# Patient Record
Sex: MALE | Race: WHITE | NOT HISPANIC OR LATINO
[De-identification: names, ages, dates, MRNs, and addresses within clinical notes are randomized per-mention and may not be internally consistent; named-entity substitution may affect disease eponyms.]

---

## 2017-02-20 ENCOUNTER — RX RENEWAL (OUTPATIENT)
Age: 82
End: 2017-02-20

## 2017-03-02 ENCOUNTER — LABORATORY RESULT (OUTPATIENT)
Age: 82
End: 2017-03-02

## 2017-03-02 ENCOUNTER — APPOINTMENT (OUTPATIENT)
Dept: INTERNAL MEDICINE | Facility: CLINIC | Age: 82
End: 2017-03-02

## 2017-07-04 ENCOUNTER — RX RENEWAL (OUTPATIENT)
Age: 82
End: 2017-07-04

## 2017-08-22 ENCOUNTER — FORM ENCOUNTER (OUTPATIENT)
Age: 82
End: 2017-08-22

## 2017-08-22 ENCOUNTER — APPOINTMENT (OUTPATIENT)
Dept: INTERNAL MEDICINE | Facility: CLINIC | Age: 82
End: 2017-08-22
Payer: MEDICARE

## 2017-08-22 ENCOUNTER — LABORATORY RESULT (OUTPATIENT)
Age: 82
End: 2017-08-22

## 2017-08-22 ENCOUNTER — NON-APPOINTMENT (OUTPATIENT)
Age: 82
End: 2017-08-22

## 2017-08-22 VITALS — BODY MASS INDEX: 32.96 KG/M2 | WEIGHT: 210 LBS | HEIGHT: 67 IN

## 2017-08-22 VITALS — SYSTOLIC BLOOD PRESSURE: 120 MMHG | DIASTOLIC BLOOD PRESSURE: 70 MMHG

## 2017-08-22 DIAGNOSIS — S73.191D OTHER SPRAIN OF RIGHT HIP, SUBSEQUENT ENCOUNTER: ICD-10-CM

## 2017-08-22 DIAGNOSIS — Z87.891 PERSONAL HISTORY OF NICOTINE DEPENDENCE: ICD-10-CM

## 2017-08-22 LAB
BILIRUB UR QL STRIP: NORMAL
CLARITY UR: CLEAR
COLLECTION METHOD: NORMAL
GLUCOSE UR-MCNC: NORMAL
HCG UR QL: 0.2 EU/DL
HGB UR QL STRIP.AUTO: NORMAL
KETONES UR-MCNC: NORMAL
LEUKOCYTE ESTERASE UR QL STRIP: NORMAL
NITRITE UR QL STRIP: NORMAL
PH UR STRIP: 5
PROT UR STRIP-MCNC: NORMAL
SP GR UR STRIP: 1.02

## 2017-08-22 PROCEDURE — 93000 ELECTROCARDIOGRAM COMPLETE: CPT

## 2017-08-22 PROCEDURE — G0439: CPT | Mod: GA

## 2017-08-22 PROCEDURE — G0009: CPT

## 2017-08-22 PROCEDURE — 81003 URINALYSIS AUTO W/O SCOPE: CPT | Mod: QW

## 2017-08-22 PROCEDURE — 90732 PPSV23 VACC 2 YRS+ SUBQ/IM: CPT | Mod: GA

## 2017-08-22 PROCEDURE — 36415 COLL VENOUS BLD VENIPUNCTURE: CPT

## 2017-08-23 ENCOUNTER — APPOINTMENT (OUTPATIENT)
Dept: MRI IMAGING | Facility: CLINIC | Age: 82
End: 2017-08-23
Payer: MEDICARE

## 2017-08-23 ENCOUNTER — OUTPATIENT (OUTPATIENT)
Dept: OUTPATIENT SERVICES | Facility: HOSPITAL | Age: 82
LOS: 1 days | End: 2017-08-23
Payer: MEDICARE

## 2017-08-23 ENCOUNTER — CLINICAL ADVICE (OUTPATIENT)
Age: 82
End: 2017-08-23

## 2017-08-23 DIAGNOSIS — M54.31 SCIATICA, RIGHT SIDE: ICD-10-CM

## 2017-08-23 DIAGNOSIS — Z00.8 ENCOUNTER FOR OTHER GENERAL EXAMINATION: ICD-10-CM

## 2017-08-23 LAB
25(OH)D3 SERPL-MCNC: 31.3 NG/ML
ALBUMIN SERPL ELPH-MCNC: 4.2 G/DL
ALP BLD-CCNC: 58 U/L
ALT SERPL-CCNC: 24 U/L
ANION GAP SERPL CALC-SCNC: 13 MMOL/L
AST SERPL-CCNC: 26 U/L
BASOPHILS # BLD AUTO: 0.03 K/UL
BASOPHILS NFR BLD AUTO: 0.5 %
BILIRUB SERPL-MCNC: 0.5 MG/DL
BUN SERPL-MCNC: 15 MG/DL
CALCIUM SERPL-MCNC: 9 MG/DL
CHLORIDE SERPL-SCNC: 108 MMOL/L
CHOLEST SERPL-MCNC: 179 MG/DL
CHOLEST/HDLC SERPL: 3.5 RATIO
CO2 SERPL-SCNC: 24 MMOL/L
CREAT SERPL-MCNC: 0.86 MG/DL
EOSINOPHIL # BLD AUTO: 0.24 K/UL
EOSINOPHIL NFR BLD AUTO: 3.7 %
FERRITIN SERPL-MCNC: 468 NG/ML
GLUCOSE SERPL-MCNC: 80 MG/DL
HBA1C MFR BLD HPLC: 5.7 %
HCT VFR BLD CALC: 45.4 %
HDLC SERPL-MCNC: 51 MG/DL
HGB BLD-MCNC: 14.9 G/DL
IMM GRANULOCYTES NFR BLD AUTO: 0.5 %
LDLC SERPL CALC-MCNC: 105 MG/DL
LYMPHOCYTES # BLD AUTO: 2.08 K/UL
LYMPHOCYTES NFR BLD AUTO: 31.7 %
MAN DIFF?: NORMAL
MCHC RBC-ENTMCNC: 30.9 PG
MCHC RBC-ENTMCNC: 32.8 GM/DL
MCV RBC AUTO: 94.2 FL
MONOCYTES # BLD AUTO: 0.46 K/UL
MONOCYTES NFR BLD AUTO: 7 %
NEUTROPHILS # BLD AUTO: 3.73 K/UL
NEUTROPHILS NFR BLD AUTO: 56.6 %
PLATELET # BLD AUTO: 175 K/UL
POTASSIUM SERPL-SCNC: 4.4 MMOL/L
PROT SERPL-MCNC: 7.4 G/DL
PSA SERPL-MCNC: 5.15 NG/ML
RBC # BLD: 4.82 M/UL
RBC # FLD: 14.4 %
SAVE SPECIMEN: NORMAL
SODIUM SERPL-SCNC: 145 MMOL/L
T3RU NFR SERPL: 1.02 INDEX
T4 SERPL-MCNC: 5.1 UG/DL
TRIGL SERPL-MCNC: 117 MG/DL
TSH SERPL-ACNC: 4.47 UIU/ML
URATE SERPL-MCNC: 6.7 MG/DL
VIT B12 SERPL-MCNC: 544 PG/ML
WBC # FLD AUTO: 6.57 K/UL

## 2017-08-23 PROCEDURE — 73721 MRI JNT OF LWR EXTRE W/O DYE: CPT | Mod: 26,RT

## 2017-08-23 PROCEDURE — 72148 MRI LUMBAR SPINE W/O DYE: CPT | Mod: 26

## 2017-08-23 PROCEDURE — 72148 MRI LUMBAR SPINE W/O DYE: CPT

## 2017-08-23 PROCEDURE — 73721 MRI JNT OF LWR EXTRE W/O DYE: CPT

## 2017-08-28 PROBLEM — S73.191D TEAR OF ACETABULAR LABRUM, RIGHT, SUBSEQUENT ENCOUNTER: Status: ACTIVE | Noted: 2017-08-28

## 2017-10-02 ENCOUNTER — APPOINTMENT (OUTPATIENT)
Dept: INTERNAL MEDICINE | Facility: CLINIC | Age: 82
End: 2017-10-02
Payer: MEDICARE

## 2017-10-02 ENCOUNTER — FORM ENCOUNTER (OUTPATIENT)
Age: 82
End: 2017-10-02

## 2017-10-02 VITALS — HEIGHT: 67 IN | TEMPERATURE: 98.2 F | BODY MASS INDEX: 32.96 KG/M2 | WEIGHT: 210 LBS

## 2017-10-02 VITALS — DIASTOLIC BLOOD PRESSURE: 80 MMHG | SYSTOLIC BLOOD PRESSURE: 120 MMHG

## 2017-10-02 DIAGNOSIS — J45.909 UNSPECIFIED ASTHMA, UNCOMPLICATED: ICD-10-CM

## 2017-10-02 DIAGNOSIS — Z87.09 PERSONAL HISTORY OF OTHER DISEASES OF THE RESPIRATORY SYSTEM: ICD-10-CM

## 2017-10-02 DIAGNOSIS — R06.00 DYSPNEA, UNSPECIFIED: ICD-10-CM

## 2017-10-02 PROCEDURE — 99214 OFFICE O/P EST MOD 30 MIN: CPT

## 2017-10-03 ENCOUNTER — OUTPATIENT (OUTPATIENT)
Dept: OUTPATIENT SERVICES | Facility: HOSPITAL | Age: 82
LOS: 1 days | End: 2017-10-03
Payer: MEDICARE

## 2017-10-03 ENCOUNTER — APPOINTMENT (OUTPATIENT)
Dept: RADIOLOGY | Facility: CLINIC | Age: 82
End: 2017-10-03
Payer: MEDICARE

## 2017-10-03 DIAGNOSIS — J20.9 ACUTE BRONCHITIS, UNSPECIFIED: ICD-10-CM

## 2017-10-03 DIAGNOSIS — J45.909 UNSPECIFIED ASTHMA, UNCOMPLICATED: ICD-10-CM

## 2017-10-03 PROCEDURE — 71046 X-RAY EXAM CHEST 2 VIEWS: CPT

## 2017-10-03 PROCEDURE — 71020: CPT | Mod: 26

## 2017-12-08 ENCOUNTER — LABORATORY RESULT (OUTPATIENT)
Age: 82
End: 2017-12-08

## 2017-12-08 ENCOUNTER — APPOINTMENT (OUTPATIENT)
Dept: INTERNAL MEDICINE | Facility: CLINIC | Age: 82
End: 2017-12-08
Payer: MEDICARE

## 2017-12-08 VITALS — SYSTOLIC BLOOD PRESSURE: 120 MMHG | DIASTOLIC BLOOD PRESSURE: 70 MMHG

## 2017-12-08 VITALS — HEIGHT: 67.5 IN | WEIGHT: 209 LBS | BODY MASS INDEX: 32.42 KG/M2

## 2017-12-08 LAB
BILIRUB UR QL STRIP: NORMAL
CLARITY UR: CLEAR
COLLECTION METHOD: NORMAL
GLUCOSE UR-MCNC: NORMAL
HCG UR QL: 0.2 EU/DL
HGB UR QL STRIP.AUTO: NORMAL
KETONES UR-MCNC: NORMAL
LEUKOCYTE ESTERASE UR QL STRIP: NORMAL
NITRITE UR QL STRIP: NORMAL
PH UR STRIP: 5
PROT UR STRIP-MCNC: NORMAL
SP GR UR STRIP: >=1.03

## 2017-12-08 PROCEDURE — 81003 URINALYSIS AUTO W/O SCOPE: CPT | Mod: QW

## 2017-12-08 PROCEDURE — 36415 COLL VENOUS BLD VENIPUNCTURE: CPT

## 2017-12-08 PROCEDURE — 99214 OFFICE O/P EST MOD 30 MIN: CPT | Mod: 25

## 2017-12-09 LAB
25(OH)D3 SERPL-MCNC: 29.6 NG/ML
ALBUMIN SERPL ELPH-MCNC: 3.8 G/DL
ALP BLD-CCNC: 64 U/L
ALT SERPL-CCNC: 40 U/L
AMYLASE/CREAT SERPL: 85 U/L
ANION GAP SERPL CALC-SCNC: 16 MMOL/L
AST SERPL-CCNC: 31 U/L
BASOPHILS # BLD AUTO: 0.01 K/UL
BASOPHILS NFR BLD AUTO: 0.1 %
BILIRUB SERPL-MCNC: 0.6 MG/DL
BUN SERPL-MCNC: 23 MG/DL
CALCIUM SERPL-MCNC: 9.3 MG/DL
CHLORIDE SERPL-SCNC: 105 MMOL/L
CHOLEST SERPL-MCNC: 145 MG/DL
CHOLEST/HDLC SERPL: 3.5 RATIO
CO2 SERPL-SCNC: 20 MMOL/L
CREAT SERPL-MCNC: 1.08 MG/DL
EOSINOPHIL # BLD AUTO: 0.31 K/UL
EOSINOPHIL NFR BLD AUTO: 3.5 %
GLUCOSE SERPL-MCNC: 123 MG/DL
HBA1C MFR BLD HPLC: 5.8 %
HCT VFR BLD CALC: 47.1 %
HDLC SERPL-MCNC: 41 MG/DL
HGB BLD-MCNC: 16.3 G/DL
IMM GRANULOCYTES NFR BLD AUTO: 0.7 %
LDLC SERPL CALC-MCNC: 77 MG/DL
LPL SERPL-CCNC: 126 U/L
LYMPHOCYTES # BLD AUTO: 1.37 K/UL
LYMPHOCYTES NFR BLD AUTO: 15.4 %
MAN DIFF?: NORMAL
MCHC RBC-ENTMCNC: 32.9 PG
MCHC RBC-ENTMCNC: 34.6 GM/DL
MCV RBC AUTO: 95.2 FL
MONOCYTES # BLD AUTO: 0.46 K/UL
MONOCYTES NFR BLD AUTO: 5.2 %
NEUTROPHILS # BLD AUTO: 6.66 K/UL
NEUTROPHILS NFR BLD AUTO: 75.1 %
PLATELET # BLD AUTO: 207 K/UL
POTASSIUM SERPL-SCNC: 4.3 MMOL/L
PROT SERPL-MCNC: 7.4 G/DL
RBC # BLD: 4.95 M/UL
RBC # FLD: 14.4 %
SAVE SPECIMEN: NORMAL
SODIUM SERPL-SCNC: 141 MMOL/L
T3RU NFR SERPL: 1.02 INDEX
T4 SERPL-MCNC: 5.7 UG/DL
TRIGL SERPL-MCNC: 137 MG/DL
TSH SERPL-ACNC: 4.39 UIU/ML
URATE SERPL-MCNC: 7.1 MG/DL
WBC # FLD AUTO: 8.87 K/UL

## 2017-12-11 ENCOUNTER — FORM ENCOUNTER (OUTPATIENT)
Age: 82
End: 2017-12-11

## 2017-12-12 ENCOUNTER — APPOINTMENT (OUTPATIENT)
Dept: MRI IMAGING | Facility: CLINIC | Age: 82
End: 2017-12-12
Payer: MEDICARE

## 2017-12-12 ENCOUNTER — OUTPATIENT (OUTPATIENT)
Dept: OUTPATIENT SERVICES | Facility: HOSPITAL | Age: 82
LOS: 1 days | End: 2017-12-12
Payer: MEDICARE

## 2017-12-12 DIAGNOSIS — R10.84 GENERALIZED ABDOMINAL PAIN: ICD-10-CM

## 2017-12-12 PROCEDURE — 74183 MRI ABD W/O CNTR FLWD CNTR: CPT

## 2017-12-12 PROCEDURE — A9585: CPT

## 2017-12-12 PROCEDURE — 74183 MRI ABD W/O CNTR FLWD CNTR: CPT | Mod: 26

## 2018-03-06 ENCOUNTER — LABORATORY RESULT (OUTPATIENT)
Age: 83
End: 2018-03-06

## 2018-03-06 ENCOUNTER — APPOINTMENT (OUTPATIENT)
Dept: INTERNAL MEDICINE | Facility: CLINIC | Age: 83
End: 2018-03-06
Payer: MEDICARE

## 2018-03-06 VITALS — WEIGHT: 214 LBS | BODY MASS INDEX: 33.59 KG/M2 | HEIGHT: 67 IN

## 2018-03-06 VITALS — SYSTOLIC BLOOD PRESSURE: 120 MMHG | DIASTOLIC BLOOD PRESSURE: 70 MMHG

## 2018-03-06 DIAGNOSIS — R10.84 GENERALIZED ABDOMINAL PAIN: ICD-10-CM

## 2018-03-06 DIAGNOSIS — K57.92 DIVERTICULITIS OF INTESTINE, PART UNSPECIFIED, W/OUT PERFORATION OR ABSCESS W/OUT BLEEDING: ICD-10-CM

## 2018-03-06 PROCEDURE — 81003 URINALYSIS AUTO W/O SCOPE: CPT | Mod: QW

## 2018-03-06 PROCEDURE — 36415 COLL VENOUS BLD VENIPUNCTURE: CPT

## 2018-03-06 PROCEDURE — 99214 OFFICE O/P EST MOD 30 MIN: CPT | Mod: 25

## 2018-03-08 ENCOUNTER — CLINICAL ADVICE (OUTPATIENT)
Age: 83
End: 2018-03-08

## 2018-03-08 LAB
25(OH)D3 SERPL-MCNC: 29.6 NG/ML
ALBUMIN SERPL ELPH-MCNC: 4.1 G/DL
ALP BLD-CCNC: 59 U/L
ALT SERPL-CCNC: 34 U/L
ANION GAP SERPL CALC-SCNC: 14 MMOL/L
APPEARANCE: CLEAR
AST SERPL-CCNC: 66 U/L
BACTERIA: NEGATIVE
BASOPHILS # BLD AUTO: 0.02 K/UL
BASOPHILS NFR BLD AUTO: 0.3 %
BILIRUB SERPL-MCNC: 0.6 MG/DL
BILIRUBIN URINE: NEGATIVE
BLOOD URINE: NEGATIVE
BUN SERPL-MCNC: 12 MG/DL
CALCIUM SERPL-MCNC: 9.2 MG/DL
CHLORIDE SERPL-SCNC: 105 MMOL/L
CHOLEST SERPL-MCNC: 148 MG/DL
CHOLEST/HDLC SERPL: 3.1 RATIO
CO2 SERPL-SCNC: 24 MMOL/L
COLOR: ABNORMAL
CREAT SERPL-MCNC: 0.93 MG/DL
EOSINOPHIL # BLD AUTO: 0.27 K/UL
EOSINOPHIL NFR BLD AUTO: 4.7 %
GLUCOSE QUALITATIVE U: NEGATIVE MG/DL
GLUCOSE SERPL-MCNC: 86 MG/DL
HBA1C MFR BLD HPLC: 5.7 %
HCT VFR BLD CALC: 48.9 %
HDLC SERPL-MCNC: 48 MG/DL
HGB BLD-MCNC: 16.3 G/DL
HYALINE CASTS: 0 /LPF
IMM GRANULOCYTES NFR BLD AUTO: 0.3 %
KETONES URINE: NEGATIVE
LDLC SERPL CALC-MCNC: 79 MG/DL
LEUKOCYTE ESTERASE URINE: NEGATIVE
LYMPHOCYTES # BLD AUTO: 1.7 K/UL
LYMPHOCYTES NFR BLD AUTO: 29.5 %
MAN DIFF?: NORMAL
MCHC RBC-ENTMCNC: 31.9 PG
MCHC RBC-ENTMCNC: 33.3 GM/DL
MCV RBC AUTO: 95.7 FL
MICROSCOPIC-UA: NORMAL
MONOCYTES # BLD AUTO: 0.46 K/UL
MONOCYTES NFR BLD AUTO: 8 %
NEUTROPHILS # BLD AUTO: 3.3 K/UL
NEUTROPHILS NFR BLD AUTO: 57.2 %
NITRITE URINE: NEGATIVE
PH URINE: 5
PLATELET # BLD AUTO: 173 K/UL
POTASSIUM SERPL-SCNC: 4.4 MMOL/L
PROT SERPL-MCNC: 7.2 G/DL
PROTEIN URINE: NEGATIVE MG/DL
PSA FREE FLD-MCNC: 20.4
PSA FREE SERPL-MCNC: 0.89 NG/ML
PSA SERPL-MCNC: 4.37 NG/ML
RBC # BLD: 5.11 M/UL
RBC # FLD: 14.4 %
RED BLOOD CELLS URINE: 3 /HPF
SAVE SPECIMEN: NORMAL
SODIUM SERPL-SCNC: 143 MMOL/L
SPECIFIC GRAVITY URINE: 1.02
SQUAMOUS EPITHELIAL CELLS: 0 /HPF
T3RU NFR SERPL: 1.04 INDEX
T4 SERPL-MCNC: 5.8 UG/DL
TRIGL SERPL-MCNC: 103 MG/DL
TSH SERPL-ACNC: 6.11 UIU/ML
URATE SERPL-MCNC: 6.5 MG/DL
UROBILINOGEN URINE: NEGATIVE MG/DL
WBC # FLD AUTO: 5.77 K/UL
WHITE BLOOD CELLS URINE: 1 /HPF

## 2018-03-11 ENCOUNTER — RX RENEWAL (OUTPATIENT)
Age: 83
End: 2018-03-11

## 2018-03-28 ENCOUNTER — APPOINTMENT (OUTPATIENT)
Dept: INTERNAL MEDICINE | Facility: CLINIC | Age: 83
End: 2018-03-28
Payer: MEDICARE

## 2018-03-28 LAB — TSH SERPL-ACNC: 4.05 UIU/ML

## 2018-03-28 PROCEDURE — 36415 COLL VENOUS BLD VENIPUNCTURE: CPT

## 2018-06-09 ENCOUNTER — RX RENEWAL (OUTPATIENT)
Age: 83
End: 2018-06-09

## 2018-08-23 ENCOUNTER — LABORATORY RESULT (OUTPATIENT)
Age: 83
End: 2018-08-23

## 2018-08-23 ENCOUNTER — NON-APPOINTMENT (OUTPATIENT)
Age: 83
End: 2018-08-23

## 2018-08-23 ENCOUNTER — APPOINTMENT (OUTPATIENT)
Dept: INTERNAL MEDICINE | Facility: CLINIC | Age: 83
End: 2018-08-23
Payer: MEDICARE

## 2018-08-23 VITALS — BODY MASS INDEX: 33.43 KG/M2 | HEIGHT: 67 IN | WEIGHT: 213 LBS

## 2018-08-23 VITALS — SYSTOLIC BLOOD PRESSURE: 120 MMHG | DIASTOLIC BLOOD PRESSURE: 70 MMHG

## 2018-08-23 LAB
BILIRUB UR QL STRIP: NORMAL
CLARITY UR: CLEAR
COLLECTION METHOD: NORMAL
DATE COLLECTED: NORMAL
GLUCOSE UR-MCNC: NORMAL
HCG UR QL: 0.2 EU/DL
HEMOCCULT SP1 STL QL: NEGATIVE
HGB UR QL STRIP.AUTO: NORMAL
KETONES UR-MCNC: NORMAL
LEUKOCYTE ESTERASE UR QL STRIP: NORMAL
NITRITE UR QL STRIP: NORMAL
PH UR STRIP: 5
PROT UR STRIP-MCNC: NORMAL
QUALITY CONTROL: YES
SP GR UR STRIP: 1.02

## 2018-08-23 PROCEDURE — G0439: CPT

## 2018-08-23 PROCEDURE — 81003 URINALYSIS AUTO W/O SCOPE: CPT | Mod: QW

## 2018-08-23 PROCEDURE — 93000 ELECTROCARDIOGRAM COMPLETE: CPT | Mod: 59

## 2018-08-23 PROCEDURE — 36415 COLL VENOUS BLD VENIPUNCTURE: CPT

## 2018-08-23 RX ORDER — AZITHROMYCIN 250 MG/1
250 TABLET, FILM COATED ORAL
Qty: 1 | Refills: 0 | Status: DISCONTINUED | COMMUNITY
Start: 2017-10-02 | End: 2018-08-23

## 2018-08-23 NOTE — HEALTH RISK ASSESSMENT
[] : No [No falls in past year] : Patient reported no falls in the past year [0] : 2) Feeling down, depressed, or hopeless: Not at all (0) [[id number]] : 0 [Fully functional (bathing, dressing, toileting, transferring, walking, feeding)] : Fully functional (bathing, dressing, toileting, transferring, walking, feeding) [Fully functional (using the telephone, shopping, preparing meals, housekeeping, doing laundry, using] : Fully functional and needs no help or supervision to perform IADLs (using the telephone, shopping, preparing meals, housekeeping, doing laundry, using transportation, managing medications and managing finances) [Discussed at today's visit] : Advance Directives Discussed at today's visit

## 2018-08-23 NOTE — PHYSICAL EXAM
[No Acute Distress] : no acute distress [Well Nourished] : well nourished [Normal Sclera/Conjunctiva] : normal sclera/conjunctiva [PERRL] : pupils equal round and reactive to light [EOMI] : extraocular movements intact [No JVD] : no jugular venous distention [Supple] : supple [No Lymphadenopathy] : no lymphadenopathy [Clear to Auscultation] : lungs were clear to auscultation bilaterally [No Accessory Muscle Use] : no accessory muscle use [Normal Rate] : normal rate  [Regular Rhythm] : with a regular rhythm [Normal S1, S2] : normal S1 and S2 [No Carotid Bruits] : no carotid bruits [No Varicosities] : no varicosities [Pedal Pulses Present] : the pedal pulses are present [Soft] : abdomen soft [Non Tender] : non-tender [Non-distended] : non-distended [No HSM] : no HSM [Normal Sphincter Tone] : normal sphincter tone [Stool Occult Blood] : stool negative for occult blood [No Rash] : no rash [No Skin Lesions] : no skin lesions [de-identified] : BPH

## 2018-08-23 NOTE — HISTORY OF PRESENT ILLNESS
[de-identified] : This is an 83-year-old gentleman with a history of steroid-induced pancreatitis, hypertension, diverticulitis who is here today for his annual examination. His main problem is lumbar stenosis and radiculopathy. He is up-to-date with his immunization and colonoscopy

## 2018-08-23 NOTE — REVIEW OF SYSTEMS
[Hesitancy] : hesitancy [Nocturia] : nocturia [Impotence] : impotence [Poor Libido] : poor libido [Joint Pain] : joint pain [Joint Stiffness] : joint stiffness [Muscle Pain] : muscle pain [Negative] : Integumentary

## 2018-08-23 NOTE — ASSESSMENT
[FreeTextEntry1] : His physical examination was normal except for a BPH. His EKG revealed no acute changes. Some bloods were obtained

## 2018-08-24 LAB
25(OH)D3 SERPL-MCNC: 28.6 NG/ML
ALBUMIN SERPL ELPH-MCNC: 4.4 G/DL
ALP BLD-CCNC: 61 U/L
ALT SERPL-CCNC: 24 U/L
ANION GAP SERPL CALC-SCNC: 10 MMOL/L
AST SERPL-CCNC: 31 U/L
BASOPHILS # BLD AUTO: 0.01 K/UL
BASOPHILS NFR BLD AUTO: 0.2 %
BILIRUB SERPL-MCNC: 0.7 MG/DL
BUN SERPL-MCNC: 14 MG/DL
CALCIUM SERPL-MCNC: 9.4 MG/DL
CHLORIDE SERPL-SCNC: 106 MMOL/L
CHOLEST SERPL-MCNC: 154 MG/DL
CHOLEST/HDLC SERPL: 3.6 RATIO
CO2 SERPL-SCNC: 24 MMOL/L
CREAT SERPL-MCNC: 1 MG/DL
ENA SS-A AB SER IA-ACNC: <0.2 AL
ENA SS-B AB SER IA-ACNC: <0.2 AL
EOSINOPHIL # BLD AUTO: 0.13 K/UL
EOSINOPHIL NFR BLD AUTO: 2.2 %
FERRITIN SERPL-MCNC: 440 NG/ML
GLUCOSE SERPL-MCNC: 93 MG/DL
HBA1C MFR BLD HPLC: 5.8 %
HCT VFR BLD CALC: 48.8 %
HDLC SERPL-MCNC: 43 MG/DL
HGB BLD-MCNC: 15.7 G/DL
IMM GRANULOCYTES NFR BLD AUTO: 0.3 %
LDLC SERPL CALC-MCNC: 82 MG/DL
LYMPHOCYTES # BLD AUTO: 1.46 K/UL
LYMPHOCYTES NFR BLD AUTO: 24.5 %
MAN DIFF?: NORMAL
MCHC RBC-ENTMCNC: 30.6 PG
MCHC RBC-ENTMCNC: 32.2 GM/DL
MCV RBC AUTO: 95.1 FL
MONOCYTES # BLD AUTO: 0.39 K/UL
MONOCYTES NFR BLD AUTO: 6.5 %
NEUTROPHILS # BLD AUTO: 3.95 K/UL
NEUTROPHILS NFR BLD AUTO: 66.3 %
PLATELET # BLD AUTO: 187 K/UL
POTASSIUM SERPL-SCNC: 4.8 MMOL/L
PROT SERPL-MCNC: 7.4 G/DL
PSA SERPL-MCNC: 5.64 NG/ML
RBC # BLD: 5.13 M/UL
RBC # FLD: 14.6 %
SAVE SPECIMEN: NORMAL
SODIUM SERPL-SCNC: 141 MMOL/L
T3RU NFR SERPL: 1.07 INDEX
T4 SERPL-MCNC: 5.4 UG/DL
TRIGL SERPL-MCNC: 147 MG/DL
TSH SERPL-ACNC: 3.32 UIU/ML
URATE SERPL-MCNC: 7.1 MG/DL
VIT B12 SERPL-MCNC: 580 PG/ML
WBC # FLD AUTO: 5.96 K/UL

## 2018-08-27 ENCOUNTER — RX RENEWAL (OUTPATIENT)
Age: 83
End: 2018-08-27

## 2018-08-31 ENCOUNTER — RX RENEWAL (OUTPATIENT)
Age: 83
End: 2018-08-31

## 2018-10-05 ENCOUNTER — MED ADMIN CHARGE (OUTPATIENT)
Age: 83
End: 2018-10-05

## 2018-10-05 ENCOUNTER — APPOINTMENT (OUTPATIENT)
Dept: INTERNAL MEDICINE | Facility: CLINIC | Age: 83
End: 2018-10-05
Payer: MEDICARE

## 2018-10-05 PROCEDURE — 90686 IIV4 VACC NO PRSV 0.5 ML IM: CPT

## 2018-10-05 PROCEDURE — G0008: CPT

## 2018-11-15 ENCOUNTER — LABORATORY RESULT (OUTPATIENT)
Age: 83
End: 2018-11-15

## 2018-11-15 ENCOUNTER — APPOINTMENT (OUTPATIENT)
Dept: INTERNAL MEDICINE | Facility: CLINIC | Age: 83
End: 2018-11-15
Payer: MEDICARE

## 2018-11-15 VITALS — SYSTOLIC BLOOD PRESSURE: 120 MMHG | DIASTOLIC BLOOD PRESSURE: 70 MMHG

## 2018-11-15 LAB
BASOPHILS # BLD AUTO: 0.02 K/UL
BASOPHILS NFR BLD AUTO: 0.3 %
EOSINOPHIL # BLD AUTO: 0.17 K/UL
EOSINOPHIL NFR BLD AUTO: 2.9 %
HCT VFR BLD CALC: 46.4 %
HGB BLD-MCNC: 15.6 G/DL
IMM GRANULOCYTES NFR BLD AUTO: 0.7 %
LYMPHOCYTES # BLD AUTO: 1.53 K/UL
LYMPHOCYTES NFR BLD AUTO: 26.2 %
MAN DIFF?: NORMAL
MCHC RBC-ENTMCNC: 31.2 PG
MCHC RBC-ENTMCNC: 33.6 GM/DL
MCV RBC AUTO: 92.8 FL
MONOCYTES # BLD AUTO: 0.31 K/UL
MONOCYTES NFR BLD AUTO: 5.3 %
NEUTROPHILS # BLD AUTO: 3.77 K/UL
NEUTROPHILS NFR BLD AUTO: 64.6 %
PLATELET # BLD AUTO: 182 K/UL
RBC # BLD: 5 M/UL
RBC # FLD: 14.2 %
SAVE SPECIMEN: NORMAL
WBC # FLD AUTO: 5.84 K/UL

## 2018-11-15 PROCEDURE — 36415 COLL VENOUS BLD VENIPUNCTURE: CPT

## 2018-11-15 PROCEDURE — 90471 IMMUNIZATION ADMIN: CPT | Mod: GY

## 2018-11-15 PROCEDURE — 99214 OFFICE O/P EST MOD 30 MIN: CPT | Mod: 25

## 2018-11-15 PROCEDURE — 90750 HZV VACC RECOMBINANT IM: CPT | Mod: GY

## 2018-11-15 NOTE — PLAN
[FreeTextEntry1] : I reviewed the patient's MRI and essentially his main problem is between L4 and L5. I referred him to neurosurgery for consultation

## 2018-11-15 NOTE — REVIEW OF SYSTEMS
[Fatigue] : fatigue [Hesitancy] : hesitancy [Nocturia] : nocturia [Frequency] : frequency [Joint Pain] : joint pain [Joint Stiffness] : joint stiffness [Muscle Pain] : muscle pain [Back Pain] : back pain [Dizziness] : dizziness [Unsteady Walk] : ataxia [Negative] : Gastrointestinal

## 2018-11-15 NOTE — HISTORY OF PRESENT ILLNESS
[de-identified] : This is an 83-year-old gentleman with a history of spinal stenosis spinal arthritis who is here because of severe back pain. He also has a history of pancreatitis secondary to steroids. In addition he has a history of hypercholesterolemia

## 2018-11-15 NOTE — PHYSICAL EXAM
[No Acute Distress] : no acute distress [Well Nourished] : well nourished [Normal Sclera/Conjunctiva] : normal sclera/conjunctiva [PERRL] : pupils equal round and reactive to light [EOMI] : extraocular movements intact [No JVD] : no jugular venous distention [Supple] : supple [No Lymphadenopathy] : no lymphadenopathy [Clear to Auscultation] : lungs were clear to auscultation bilaterally [No Accessory Muscle Use] : no accessory muscle use [Normal Rate] : normal rate  [Regular Rhythm] : with a regular rhythm [Normal S1, S2] : normal S1 and S2 [No Carotid Bruits] : no carotid bruits [No Varicosities] : no varicosities [Pedal Pulses Present] : the pedal pulses are present [Soft] : abdomen soft [Non Tender] : non-tender [Non-distended] : non-distended [No HSM] : no HSM [Normal Sphincter Tone] : normal sphincter tone [No Rash] : no rash [No Skin Lesions] : no skin lesions [Stool Occult Blood] : stool negative for occult blood [de-identified] : BPH

## 2018-11-16 LAB
25(OH)D3 SERPL-MCNC: 28.6 NG/ML
ALBUMIN MFR SERPL ELPH: 58.6 %
ALBUMIN SERPL ELPH-MCNC: 4.2 G/DL
ALBUMIN SERPL-MCNC: 4.3 G/DL
ALBUMIN/GLOB SERPL: 1.4 RATIO
ALP BLD-CCNC: 60 U/L
ALPHA1 GLOB MFR SERPL ELPH: 3.6 %
ALPHA1 GLOB SERPL ELPH-MCNC: 0.3 G/DL
ALPHA2 GLOB MFR SERPL ELPH: 9.2 %
ALPHA2 GLOB SERPL ELPH-MCNC: 0.7 G/DL
ALT SERPL-CCNC: 32 U/L
AMYLASE/CREAT SERPL: 47 U/L
ANION GAP SERPL CALC-SCNC: 11 MMOL/L
AST SERPL-CCNC: 32 U/L
B-GLOBULIN MFR SERPL ELPH: 11.9 %
B-GLOBULIN SERPL ELPH-MCNC: 0.9 G/DL
BILIRUB SERPL-MCNC: 0.6 MG/DL
BUN SERPL-MCNC: 13 MG/DL
CALCIUM SERPL-MCNC: 9.2 MG/DL
CHLORIDE SERPL-SCNC: 107 MMOL/L
CHOLEST SERPL-MCNC: 164 MG/DL
CHOLEST/HDLC SERPL: 3.8 RATIO
CO2 SERPL-SCNC: 25 MMOL/L
CREAT SERPL-MCNC: 0.98 MG/DL
ERYTHROCYTE [SEDIMENTATION RATE] IN BLOOD BY WESTERGREN METHOD: 2 MM/HR
GAMMA GLOB FLD ELPH-MCNC: 1.2 G/DL
GAMMA GLOB MFR SERPL ELPH: 16.7 %
GLUCOSE SERPL-MCNC: 104 MG/DL
HBA1C MFR BLD HPLC: 5.7 %
HDLC SERPL-MCNC: 43 MG/DL
INTERPRETATION SERPL IEP-IMP: NORMAL
LDLC SERPL CALC-MCNC: 73 MG/DL
LPL SERPL-CCNC: 25 U/L
POTASSIUM SERPL-SCNC: 4.4 MMOL/L
PROT SERPL-MCNC: 7.3 G/DL
PSA SERPL-MCNC: 5.17 NG/ML
SODIUM SERPL-SCNC: 143 MMOL/L
T3RU NFR SERPL: 1.06 INDEX
T4 SERPL-MCNC: 4.9 UG/DL
TRIGL SERPL-MCNC: 239 MG/DL
TSH SERPL-ACNC: 4.9 UIU/ML
URATE SERPL-MCNC: 5.9 MG/DL

## 2018-12-19 ENCOUNTER — APPOINTMENT (OUTPATIENT)
Dept: SPINE | Facility: CLINIC | Age: 83
End: 2018-12-19
Payer: MEDICARE

## 2018-12-19 VITALS
WEIGHT: 210 LBS | HEART RATE: 77 BPM | SYSTOLIC BLOOD PRESSURE: 132 MMHG | HEIGHT: 68 IN | DIASTOLIC BLOOD PRESSURE: 76 MMHG | BODY MASS INDEX: 31.83 KG/M2

## 2018-12-19 PROCEDURE — 99204 OFFICE O/P NEW MOD 45 MIN: CPT

## 2018-12-19 RX ORDER — DICLOFENAC SODIUM AND MISOPROSTOL 75; 200 MG/1; UG/1
75-0.2 TABLET, DELAYED RELEASE ORAL
Qty: 60 | Refills: 0 | Status: DISCONTINUED | COMMUNITY
Start: 2017-10-02 | End: 2018-12-19

## 2018-12-19 RX ORDER — ALBUTEROL SULFATE 90 UG/1
108 (90 BASE) AEROSOL, METERED RESPIRATORY (INHALATION)
Qty: 1 | Refills: 3 | Status: DISCONTINUED | COMMUNITY
Start: 2017-10-02 | End: 2018-12-19

## 2018-12-19 RX ORDER — EPINEPHRINE 0.3 MG/.3ML
0.3 INJECTION INTRAMUSCULAR
Qty: 1 | Refills: 0 | Status: DISCONTINUED | COMMUNITY
Start: 2018-08-23 | End: 2018-12-19

## 2018-12-19 RX ORDER — FLUOROMETHOLONE 1 MG/ML
0.1 SOLUTION/ DROPS OPHTHALMIC
Qty: 5 | Refills: 0 | Status: DISCONTINUED | COMMUNITY
Start: 2017-04-27 | End: 2018-12-19

## 2018-12-19 RX ORDER — METHYLPREDNISOLONE 4 MG/1
4 TABLET ORAL
Qty: 21 | Refills: 1 | Status: DISCONTINUED | COMMUNITY
Start: 2017-10-02 | End: 2018-12-19

## 2019-02-06 ENCOUNTER — APPOINTMENT (OUTPATIENT)
Dept: INTERNAL MEDICINE | Facility: CLINIC | Age: 84
End: 2019-02-06
Payer: MEDICARE

## 2019-02-06 PROCEDURE — 90471 IMMUNIZATION ADMIN: CPT | Mod: GY

## 2019-02-06 PROCEDURE — 90750 HZV VACC RECOMBINANT IM: CPT | Mod: GY

## 2019-04-22 ENCOUNTER — RX RENEWAL (OUTPATIENT)
Age: 84
End: 2019-04-22

## 2019-09-03 ENCOUNTER — RX RENEWAL (OUTPATIENT)
Age: 84
End: 2019-09-03

## 2019-10-25 ENCOUNTER — LABORATORY RESULT (OUTPATIENT)
Age: 84
End: 2019-10-25

## 2019-10-25 ENCOUNTER — APPOINTMENT (OUTPATIENT)
Dept: INTERNAL MEDICINE | Facility: CLINIC | Age: 84
End: 2019-10-25
Payer: MEDICARE

## 2019-10-25 ENCOUNTER — NON-APPOINTMENT (OUTPATIENT)
Age: 84
End: 2019-10-25

## 2019-10-25 VITALS — SYSTOLIC BLOOD PRESSURE: 120 MMHG | DIASTOLIC BLOOD PRESSURE: 70 MMHG

## 2019-10-25 VITALS — HEIGHT: 68 IN | WEIGHT: 216 LBS | BODY MASS INDEX: 32.74 KG/M2

## 2019-10-25 DIAGNOSIS — K57.32 DIVERTICULITIS OF LARGE INTESTINE W/OUT PERFORATION OR ABSCESS W/OUT BLEEDING: ICD-10-CM

## 2019-10-25 DIAGNOSIS — K85.90 ACUTE PANCREATITIS WITHOUT NECROSIS OR INFECTION, UNSPECIFIED: ICD-10-CM

## 2019-10-25 DIAGNOSIS — Z00.00 ENCOUNTER FOR GENERAL ADULT MEDICAL EXAMINATION W/OUT ABNORMAL FINDINGS: ICD-10-CM

## 2019-10-25 LAB
ALBUMIN SERPL ELPH-MCNC: 4.3 G/DL
ALP BLD-CCNC: 65 U/L
ALT SERPL-CCNC: 26 U/L
ANION GAP SERPL CALC-SCNC: 12 MMOL/L
AST SERPL-CCNC: 30 U/L
BASOPHILS # BLD AUTO: 0.03 K/UL
BASOPHILS NFR BLD AUTO: 0.5 %
BILIRUB SERPL-MCNC: 0.5 MG/DL
BILIRUB UR QL STRIP: NEGATIVE
BUN SERPL-MCNC: 13 MG/DL
CALCIUM SERPL-MCNC: 9 MG/DL
CHLORIDE SERPL-SCNC: 104 MMOL/L
CHOLEST SERPL-MCNC: 150 MG/DL
CHOLEST/HDLC SERPL: 3.3 RATIO
CLARITY UR: CLEAR
CO2 SERPL-SCNC: 23 MMOL/L
COLLECTION METHOD: NORMAL
CREAT SERPL-MCNC: 0.95 MG/DL
EOSINOPHIL # BLD AUTO: 0.16 K/UL
EOSINOPHIL NFR BLD AUTO: 2.7 %
ESTIMATED AVERAGE GLUCOSE: 120 MG/DL
FERRITIN SERPL-MCNC: 383 NG/ML
GLUCOSE SERPL-MCNC: 85 MG/DL
GLUCOSE UR-MCNC: NEGATIVE
HBA1C MFR BLD HPLC: 5.8 %
HCG UR QL: 0.2 EU/DL
HCT VFR BLD CALC: 47.7 %
HDLC SERPL-MCNC: 45 MG/DL
HGB BLD-MCNC: 15.5 G/DL
HGB UR QL STRIP.AUTO: NEGATIVE
IMM GRANULOCYTES NFR BLD AUTO: 0.7 %
KETONES UR-MCNC: NEGATIVE
LDLC SERPL CALC-MCNC: 77 MG/DL
LEUKOCYTE ESTERASE UR QL STRIP: NEGATIVE
LYMPHOCYTES # BLD AUTO: 1.48 K/UL
LYMPHOCYTES NFR BLD AUTO: 25.1 %
MAN DIFF?: NORMAL
MCHC RBC-ENTMCNC: 30.7 PG
MCHC RBC-ENTMCNC: 32.5 GM/DL
MCV RBC AUTO: 94.5 FL
MONOCYTES # BLD AUTO: 0.54 K/UL
MONOCYTES NFR BLD AUTO: 9.2 %
NEUTROPHILS # BLD AUTO: 3.64 K/UL
NEUTROPHILS NFR BLD AUTO: 61.8 %
NITRITE UR QL STRIP: NEGATIVE
PH UR STRIP: 5
PLATELET # BLD AUTO: 186 K/UL
POTASSIUM SERPL-SCNC: 4.8 MMOL/L
PROT SERPL-MCNC: 7.2 G/DL
PROT UR STRIP-MCNC: NEGATIVE
PSA SERPL-MCNC: 5.33 NG/ML
RBC # BLD: 5.05 M/UL
RBC # FLD: 14.3 %
SAVE SPECIMEN: NORMAL
SODIUM SERPL-SCNC: 139 MMOL/L
SP GR UR STRIP: 1.02
T3RU NFR SERPL: 1 TBI
T4 SERPL-MCNC: 5 UG/DL
TRIGL SERPL-MCNC: 142 MG/DL
TSH SERPL-ACNC: 6.92 UIU/ML
URATE SERPL-MCNC: 6.8 MG/DL
VIT B12 SERPL-MCNC: 502 PG/ML
WBC # FLD AUTO: 5.89 K/UL

## 2019-10-25 PROCEDURE — 93000 ELECTROCARDIOGRAM COMPLETE: CPT | Mod: 59

## 2019-10-25 PROCEDURE — 36415 COLL VENOUS BLD VENIPUNCTURE: CPT

## 2019-10-25 PROCEDURE — 81003 URINALYSIS AUTO W/O SCOPE: CPT | Mod: QW

## 2019-10-25 PROCEDURE — G0439: CPT

## 2019-10-25 PROCEDURE — 99213 OFFICE O/P EST LOW 20 MIN: CPT | Mod: 25

## 2019-10-25 NOTE — HISTORY OF PRESENT ILLNESS
[de-identified] : This is an 84-year-old gentleman who shifted for his annual wall examination. The patient is in relatively good health aside from being hypercholesterolemic and hypothyroid which is being treated with a statin with thyroid replacement. The patient also had an episode of acute diverticulitis induced by steroid therapy. And also has had attacks of diverticulitis.

## 2019-10-25 NOTE — ASSESSMENT
[FreeTextEntry1] : The patient's vital signs were stable his physical examination was normal except for being overweight and BPH. The patient was advised to exercise and lose weight

## 2019-10-25 NOTE — PHYSICAL EXAM
[Normal Sphincter Tone] : normal sphincter tone [No Mass] : no mass [Testes Mass (___cm)] : there were no testicular masses [Scrotum] : the scrotum was normal [Prostate Tenderness] : the prostate was not tender [No Prostate Nodules] : no prostate nodules [Normal] : affect was normal and insight and judgment were intact [FreeTextEntry1] : BPH

## 2019-10-26 LAB — 25(OH)D3 SERPL-MCNC: 28.2 NG/ML

## 2019-11-29 ENCOUNTER — RX RENEWAL (OUTPATIENT)
Age: 84
End: 2019-11-29

## 2020-01-12 ENCOUNTER — RX RENEWAL (OUTPATIENT)
Age: 85
End: 2020-01-12

## 2020-03-01 ENCOUNTER — RX RENEWAL (OUTPATIENT)
Age: 85
End: 2020-03-01

## 2020-05-22 ENCOUNTER — RX RENEWAL (OUTPATIENT)
Age: 85
End: 2020-05-22

## 2020-05-28 ENCOUNTER — RX RENEWAL (OUTPATIENT)
Age: 85
End: 2020-05-28

## 2020-06-23 ENCOUNTER — APPOINTMENT (OUTPATIENT)
Dept: INTERNAL MEDICINE | Facility: CLINIC | Age: 85
End: 2020-06-23

## 2020-06-26 ENCOUNTER — LABORATORY RESULT (OUTPATIENT)
Age: 85
End: 2020-06-26

## 2020-06-26 ENCOUNTER — APPOINTMENT (OUTPATIENT)
Dept: INTERNAL MEDICINE | Facility: CLINIC | Age: 85
End: 2020-06-26
Payer: MEDICARE

## 2020-06-26 VITALS — HEIGHT: 68 IN | TEMPERATURE: 98.6 F | WEIGHT: 214 LBS | BODY MASS INDEX: 32.43 KG/M2

## 2020-06-26 VITALS — SYSTOLIC BLOOD PRESSURE: 120 MMHG | DIASTOLIC BLOOD PRESSURE: 70 MMHG

## 2020-06-26 DIAGNOSIS — M51.26 OTHER INTERVERTEBRAL DISC DISPLACEMENT, LUMBAR REGION: ICD-10-CM

## 2020-06-26 DIAGNOSIS — E03.9 HYPOTHYROIDISM, UNSPECIFIED: ICD-10-CM

## 2020-06-26 DIAGNOSIS — G47.30 SLEEP APNEA, UNSPECIFIED: ICD-10-CM

## 2020-06-26 DIAGNOSIS — M54.9 DORSALGIA, UNSPECIFIED: ICD-10-CM

## 2020-06-26 DIAGNOSIS — M48.061 SPINAL STENOSIS, LUMBAR REGION WITHOUT NEUROGENIC CLAUDICATION: ICD-10-CM

## 2020-06-26 DIAGNOSIS — E78.00 PURE HYPERCHOLESTEROLEMIA, UNSPECIFIED: ICD-10-CM

## 2020-06-26 DIAGNOSIS — M48.062 SPINAL STENOSIS, LUMBAR REGION WITH NEUROGENIC CLAUDICATION: ICD-10-CM

## 2020-06-26 DIAGNOSIS — M54.31 SCIATICA, RIGHT SIDE: ICD-10-CM

## 2020-06-26 PROCEDURE — 36415 COLL VENOUS BLD VENIPUNCTURE: CPT

## 2020-06-26 PROCEDURE — 99214 OFFICE O/P EST MOD 30 MIN: CPT | Mod: 25

## 2020-06-26 RX ORDER — HYDROCODONE BITARTRATE AND ACETAMINOPHEN 5; 325 MG/1; MG/1
5-325 TABLET ORAL
Qty: 12 | Refills: 0 | Status: DISCONTINUED | COMMUNITY
Start: 2020-02-04

## 2020-06-26 RX ORDER — GABAPENTIN 100 MG/1
100 CAPSULE ORAL
Qty: 90 | Refills: 2 | Status: ACTIVE | COMMUNITY
Start: 2020-06-26 | End: 1900-01-01

## 2020-06-26 RX ORDER — IBUPROFEN 600 MG/1
600 TABLET, FILM COATED ORAL
Qty: 30 | Refills: 0 | Status: DISCONTINUED | COMMUNITY
Start: 2020-02-04

## 2020-06-26 RX ORDER — AMOXICILLIN 875 MG/1
875 TABLET, FILM COATED ORAL
Qty: 14 | Refills: 0 | Status: DISCONTINUED | COMMUNITY
Start: 2020-02-04

## 2020-06-26 RX ORDER — CHLORHEXIDINE GLUCONATE, 0.12% ORAL RINSE 1.2 MG/ML
0.12 SOLUTION DENTAL
Qty: 473 | Refills: 0 | Status: DISCONTINUED | COMMUNITY
Start: 2020-02-04

## 2020-06-26 NOTE — HISTORY OF PRESENT ILLNESS
[de-identified] : This is an 85-year-old gentleman with a history of hypercholesterolemia, hypothyroidism, sleep apnea who also has a history of significant lumbar spine disease with spinal stenosis who is complaining of back pain of with burning discomfort of both legs. The patient does not have a history of diabetes mellitus. However he is taking a statin

## 2020-06-26 NOTE — REVIEW OF SYSTEMS
[Fatigue] : fatigue [Joint Pain] : joint pain [Muscle Pain] : muscle pain [Joint Stiffness] : joint stiffness [Back Pain] : back pain [Negative] : Integumentary

## 2020-06-26 NOTE — ASSESSMENT
[FreeTextEntry1] : Problems hypercholesterolemia\par Hypothyroidism\par Sleep apnea\par Spinal stenosis\par Radiculopathy\par Assessment\par The patient's physical examination is notable for excellent pulses in the femoral arteries down to his feet. He has no history of diabetes mellitus. 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 The question is what role his statins I plan in this. I advised him to hold his statins for 2 weeks and to see if he felt better. In addition I'm starting him on gabapentin 100 mg nightly to be increased on a weekly basis by 100 mg chills he reaches 300 mg and then will call me and advised Ms. Ramos she'll\par

## 2020-06-26 NOTE — PHYSICAL EXAM
[Normal] : soft, non-tender, non-distended, no masses palpated, no HSM and normal bowel sounds [de-identified] : Reflexes absent bilaterally from the knee down

## 2020-06-30 LAB
25(OH)D3 SERPL-MCNC: 30.3 NG/ML
ALBUMIN SERPL ELPH-MCNC: 4.2 G/DL
ALP BLD-CCNC: 60 U/L
ALT SERPL-CCNC: 26 U/L
ANION GAP SERPL CALC-SCNC: 11 MMOL/L
APPEARANCE: CLEAR
AST SERPL-CCNC: 25 U/L
BACTERIA: NEGATIVE
BASOPHILS # BLD AUTO: 0.05 K/UL
BASOPHILS NFR BLD AUTO: 0.9 %
BILIRUB SERPL-MCNC: 0.6 MG/DL
BILIRUBIN URINE: NEGATIVE
BLOOD URINE: NEGATIVE
BUN SERPL-MCNC: 13 MG/DL
CALCIUM SERPL-MCNC: 9 MG/DL
CHLORIDE SERPL-SCNC: 107 MMOL/L
CHOLEST SERPL-MCNC: 151 MG/DL
CHOLEST/HDLC SERPL: 3.3 RATIO
CO2 SERPL-SCNC: 24 MMOL/L
COLOR: ABNORMAL
CREAT SERPL-MCNC: 0.91 MG/DL
EOSINOPHIL # BLD AUTO: 0.11 K/UL
EOSINOPHIL NFR BLD AUTO: 1.9 %
ERYTHROCYTE [SEDIMENTATION RATE] IN BLOOD BY WESTERGREN METHOD: 18 MM/HR
ESTIMATED AVERAGE GLUCOSE: 120 MG/DL
FERRITIN SERPL-MCNC: 410 NG/ML
FOLATE SERPL-MCNC: >20 NG/ML
GLUCOSE QUALITATIVE U: NEGATIVE
GLUCOSE SERPL-MCNC: 119 MG/DL
HBA1C MFR BLD HPLC: 5.8 %
HCT VFR BLD CALC: 47.4 %
HDLC SERPL-MCNC: 45 MG/DL
HGB BLD-MCNC: 15.6 G/DL
HYALINE CASTS: 2 /LPF
IMM GRANULOCYTES NFR BLD AUTO: 0.5 %
KETONES URINE: NEGATIVE
LDLC SERPL CALC-MCNC: 79 MG/DL
LEUKOCYTE ESTERASE URINE: NEGATIVE
LYMPHOCYTES # BLD AUTO: 1.4 K/UL
LYMPHOCYTES NFR BLD AUTO: 24.1 %
MAN DIFF?: NORMAL
MCHC RBC-ENTMCNC: 31.5 PG
MCHC RBC-ENTMCNC: 32.9 GM/DL
MCV RBC AUTO: 95.6 FL
MICROSCOPIC-UA: NORMAL
MONOCYTES # BLD AUTO: 0.47 K/UL
MONOCYTES NFR BLD AUTO: 8.1 %
NEUTROPHILS # BLD AUTO: 3.76 K/UL
NEUTROPHILS NFR BLD AUTO: 64.5 %
NITRITE URINE: NEGATIVE
PH URINE: 5.5
PLATELET # BLD AUTO: 186 K/UL
POTASSIUM SERPL-SCNC: 4.4 MMOL/L
PROT SERPL-MCNC: 7.1 G/DL
PROTEIN URINE: NORMAL
PSA SERPL-MCNC: 6.71 NG/ML
RBC # BLD: 4.96 M/UL
RBC # FLD: 13.7 %
RED BLOOD CELLS URINE: 3 /HPF
SAVE SPECIMEN: NORMAL
SODIUM SERPL-SCNC: 142 MMOL/L
SPECIFIC GRAVITY URINE: 1.03
SQUAMOUS EPITHELIAL CELLS: 1 /HPF
T3RU NFR SERPL: 1 TBI
T4 SERPL-MCNC: 4.9 UG/DL
TRIGL SERPL-MCNC: 132 MG/DL
TSH SERPL-ACNC: 3.68 UIU/ML
URATE SERPL-MCNC: 5.9 MG/DL
UROBILINOGEN URINE: NORMAL
VIT B12 SERPL-MCNC: 506 PG/ML
WBC # FLD AUTO: 5.82 K/UL
WHITE BLOOD CELLS URINE: 1 /HPF

## 2020-08-18 ENCOUNTER — RX RENEWAL (OUTPATIENT)
Age: 85
End: 2020-08-18

## 2020-08-24 ENCOUNTER — RX RENEWAL (OUTPATIENT)
Age: 85
End: 2020-08-24

## 2020-08-31 ENCOUNTER — RX RENEWAL (OUTPATIENT)
Age: 85
End: 2020-08-31

## 2020-11-09 ENCOUNTER — RX RENEWAL (OUTPATIENT)
Age: 85
End: 2020-11-09

## 2020-12-15 PROBLEM — Z87.09 HISTORY OF ACUTE BRONCHITIS: Status: RESOLVED | Noted: 2017-10-02 | Resolved: 2020-12-15

## 2021-02-06 ENCOUNTER — RX RENEWAL (OUTPATIENT)
Age: 86
End: 2021-02-06

## 2021-05-03 ENCOUNTER — RX RENEWAL (OUTPATIENT)
Age: 86
End: 2021-05-03

## 2021-10-23 ENCOUNTER — RX RENEWAL (OUTPATIENT)
Age: 86
End: 2021-10-23

## 2022-05-13 ENCOUNTER — RX RENEWAL (OUTPATIENT)
Age: 87
End: 2022-05-13

## 2022-08-31 NOTE — HEALTH RISK ASSESSMENT
[No] : In the past 12 months have you used drugs other than those required for medical reasons? No [Never (0 pts)] : Never (0 points) [No falls in past year] : Patient reported no falls in the past year [0] : 2) Feeling down, depressed, or hopeless: Not at all (0) [Fully functional (bathing, dressing, toileting, transferring, walking, feeding)] : Fully functional (bathing, dressing, toileting, transferring, walking, feeding) [Fully functional (using the telephone, shopping, preparing meals, housekeeping, doing laundry, using] : Fully functional and needs no help or supervision to perform IADLs (using the telephone, shopping, preparing meals, housekeeping, doing laundry, using transportation, managing medications and managing finances) [With Patient/Caregiver] : With Patient/Caregiver [I will adhere to the patient's wishes as expressed in the advance directive except as noted below.] : I will adhere to the patient's wishes as expressed in the advance directive except as noted below [] : No [[id number]] : 0 [AdvancecareDate] : 10/25/19 Applied

## 2023-02-28 ENCOUNTER — RX RENEWAL (OUTPATIENT)
Age: 88
End: 2023-02-28

## 2023-08-28 ENCOUNTER — RX RENEWAL (OUTPATIENT)
Age: 88
End: 2023-08-28

## 2024-07-09 ENCOUNTER — RX RENEWAL (OUTPATIENT)
Age: 89
End: 2024-07-09

## 2024-10-07 ENCOUNTER — RX RENEWAL (OUTPATIENT)
Age: 89
End: 2024-10-07